# Patient Record
Sex: FEMALE | Race: WHITE | URBAN - METROPOLITAN AREA
[De-identification: names, ages, dates, MRNs, and addresses within clinical notes are randomized per-mention and may not be internally consistent; named-entity substitution may affect disease eponyms.]

---

## 2021-06-02 ENCOUNTER — OFFICE VISIT (OUTPATIENT)
Dept: URBAN - METROPOLITAN AREA CLINIC 98 | Facility: CLINIC | Age: 31
End: 2021-06-02

## 2021-06-10 ENCOUNTER — WEB ENCOUNTER (OUTPATIENT)
Dept: URBAN - METROPOLITAN AREA CLINIC 50 | Facility: CLINIC | Age: 31
End: 2021-06-10

## 2021-06-10 ENCOUNTER — CLAIMS CREATED FROM THE CLAIM WINDOW (OUTPATIENT)
Dept: URBAN - METROPOLITAN AREA CLINIC 50 | Facility: CLINIC | Age: 31
End: 2021-06-10
Payer: COMMERCIAL

## 2021-06-10 DIAGNOSIS — R10.9 ABDOMINAL DISCOMFORT: ICD-10-CM

## 2021-06-10 DIAGNOSIS — R19.7 DIARRHEA, UNSPECIFIED TYPE: ICD-10-CM

## 2021-06-10 DIAGNOSIS — R11.2 NON-INTRACTABLE VOMITING WITH NAUSEA, UNSPECIFIED VOMITING TYPE: ICD-10-CM

## 2021-06-10 PROCEDURE — 99214 OFFICE O/P EST MOD 30 MIN: CPT | Performed by: INTERNAL MEDICINE

## 2021-06-10 RX ORDER — DICYCLOMINE HYDROCHLORIDE 10 MG/1
1 CAPSULE CAPSULE ORAL
Qty: 60 | Refills: 3 | OUTPATIENT

## 2021-06-10 RX ORDER — PANTOPRAZOLE SODIUM 20 MG/1
TAPER TABLET, DELAYED RELEASE ORAL
Qty: 100 | Refills: 0 | OUTPATIENT

## 2021-06-10 NOTE — HPI-TODAY'S VISIT:
30 y.o. WF, single, no children, works as EMS and self-employed martial arts teacher Seen 3+ yrs ago w/ Dr. Benz All my life had stomach problems - nothing new Recently, in past 1-2 years, significantly worse and last 6 months even more and 2 months dropped 10 pounds unintentionally N/V/D and constant knot in stomach Has fatigue and bone pain that comes and goes Hair loss, bloating w/ everything she eats Lots of muscle cramping and excessive bruising Occ Zofran works  Reviewed recent labs - scanned in

## 2021-06-15 ENCOUNTER — TELEPHONE ENCOUNTER (OUTPATIENT)
Dept: URBAN - METROPOLITAN AREA CLINIC 98 | Facility: CLINIC | Age: 31
End: 2021-06-15

## 2021-06-15 ENCOUNTER — TELEPHONE ENCOUNTER (OUTPATIENT)
Dept: URBAN - METROPOLITAN AREA CLINIC 92 | Facility: CLINIC | Age: 31
End: 2021-06-15

## 2021-06-16 PROBLEM — 128241005: Status: ACTIVE | Noted: 2021-06-16

## 2021-07-08 ENCOUNTER — WEB ENCOUNTER (OUTPATIENT)
Dept: URBAN - METROPOLITAN AREA CLINIC 50 | Facility: CLINIC | Age: 31
End: 2021-07-08

## 2021-07-08 LAB
ALBUMIN: 4.4
ALKALINE PHOSPHATASE: 68
ALT (SGPT): 16
AST (SGOT): 23
BILIRUBIN, DIRECT: 0.11
BILIRUBIN, TOTAL: 0.4
HBSAG SCREEN: NEGATIVE
HBV IU/ML: (no result)
HEP A AB, IGM: NEGATIVE
HEP B CORE AB, IGM: NEGATIVE
HEP BE AG: NEGATIVE
HEP C VIRUS AB: <0.1
HEPATITIS B SURF AB QUANT: <3.1
LOG10 HBV IU/ML: (no result)
PROTEIN, TOTAL: 6.6
TEST INFORMATION:: (no result)

## 2021-07-21 ENCOUNTER — WEB ENCOUNTER (OUTPATIENT)
Dept: URBAN - METROPOLITAN AREA CLINIC 50 | Facility: CLINIC | Age: 31
End: 2021-07-21

## 2021-07-21 RX ORDER — ONDANSETRON 4 MG/1
1 TABLET ON THE TONGUE AND ALLOW TO DISSOLVE TABLET, ORALLY DISINTEGRATING ORAL
Qty: 60 | Refills: 1 | OUTPATIENT

## 2021-07-22 ENCOUNTER — TELEPHONE ENCOUNTER (OUTPATIENT)
Dept: URBAN - METROPOLITAN AREA CLINIC 98 | Facility: CLINIC | Age: 31
End: 2021-07-22

## 2021-07-29 ENCOUNTER — CLAIMS CREATED FROM THE CLAIM WINDOW (OUTPATIENT)
Dept: URBAN - METROPOLITAN AREA TELEHEALTH 2 | Facility: TELEHEALTH | Age: 31
End: 2021-07-29
Payer: COMMERCIAL

## 2021-07-29 ENCOUNTER — TELEPHONE ENCOUNTER (OUTPATIENT)
Dept: URBAN - METROPOLITAN AREA CLINIC 74 | Facility: CLINIC | Age: 31
End: 2021-07-29

## 2021-07-29 ENCOUNTER — TELEPHONE ENCOUNTER (OUTPATIENT)
Dept: URBAN - METROPOLITAN AREA CLINIC 98 | Facility: CLINIC | Age: 31
End: 2021-07-29

## 2021-07-29 DIAGNOSIS — R19.7 DIARRHEA, UNSPECIFIED TYPE: ICD-10-CM

## 2021-07-29 DIAGNOSIS — R10.9 ABDOMINAL DISTRESS: ICD-10-CM

## 2021-07-29 PROCEDURE — 99443 PHONE E/M BY PHYS 21-30 MIN: CPT | Performed by: INTERNAL MEDICINE

## 2021-07-29 RX ORDER — DICYCLOMINE HYDROCHLORIDE 10 MG/1
1 CAPSULE CAPSULE ORAL
Qty: 60 | Refills: 3 | Status: ACTIVE | COMMUNITY

## 2021-07-29 RX ORDER — PANTOPRAZOLE SODIUM 20 MG/1
TAPER TABLET, DELAYED RELEASE ORAL
Qty: 100 | Refills: 0 | Status: DISCONTINUED | COMMUNITY

## 2021-07-29 RX ORDER — ONDANSETRON 4 MG/1
1 TABLET ON THE TONGUE AND ALLOW TO DISSOLVE TABLET, ORALLY DISINTEGRATING ORAL
Qty: 60 | Refills: 1 | Status: ACTIVE | COMMUNITY

## 2021-07-29 RX ORDER — RIFAXIMIN 550 MG/1
1 TABLET TABLET ORAL THREE TIMES A DAY
Qty: 42 TABLET | Refills: 0 | OUTPATIENT

## 2021-07-29 NOTE — HPI-TODAY'S VISIT:
31 y.o. WF Spasm medicine helped quite a bit - calmed down stomach Stopped the Pantoprazole - hurt her stomach - no help Has been doing Low FODMAP diet -- has been cutting out  Stomach hurts all the time still Baseline off nausea that just doesn't bother her anymore - so used to it - moments where gets really bad - better w/ zofran Diarrhea is still the biggest issue - goes to bathroom 6-7 times / day No difference w/ stopping Strattera

## 2021-08-05 ENCOUNTER — WEB ENCOUNTER (OUTPATIENT)
Dept: URBAN - METROPOLITAN AREA CLINIC 50 | Facility: CLINIC | Age: 31
End: 2021-08-05

## 2021-08-05 LAB
A/G RATIO: 2.1
ALBUMIN: 4.2
ALKALINE PHOSPHATASE: 69
ALT (SGPT): 12
AST (SGOT): 16
BASO (ABSOLUTE): 0
BASOS: 1
BILIRUBIN, TOTAL: 0.3
BUN/CREATININE RATIO: 17
BUN: 18
CALCIUM: 9.2
CARBON DIOXIDE, TOTAL: 24
CHLORIDE: 106
CREATININE: 1.08
DEAMIDATED GLIADIN ABS, IGA: 3
DEAMIDATED GLIADIN ABS, IGG: 1
EGFR IF AFRICN AM: 78
EGFR IF NONAFRICN AM: 68
ENDOMYSIAL ANTIBODY IGA: NEGATIVE
EOS (ABSOLUTE): 0
EOS: 1
GLOBULIN, TOTAL: 2
GLUCOSE: 92
HEMATOCRIT: 40.7
HEMATOLOGY COMMENTS:: (no result)
HEMOGLOBIN: 13.7
IMMATURE CELLS: (no result)
IMMATURE GRANS (ABS): 0
IMMATURE GRANULOCYTES: 0
IMMUNOGLOBULIN A, QN, SERUM: 107
LYMPHS (ABSOLUTE): 2
LYMPHS: 27
MCH: 32.3
MCHC: 33.7
MCV: 96
MONOCYTES(ABSOLUTE): 0.6
MONOCYTES: 8
NEUTROPHILS (ABSOLUTE): 4.6
NEUTROPHILS: 63
NRBC: (no result)
PLATELETS: 220
POTASSIUM: 4.4
PROTEIN, TOTAL: 6.2
RBC: 4.24
RDW: 11.7
SODIUM: 143
T-TRANSGLUTAMINASE (TTG) IGA: <2
T-TRANSGLUTAMINASE (TTG) IGG: <2
WBC: 7.3

## 2021-08-17 ENCOUNTER — LAB OUTSIDE AN ENCOUNTER (OUTPATIENT)
Dept: URBAN - METROPOLITAN AREA CLINIC 96 | Facility: CLINIC | Age: 31
End: 2021-08-17

## 2021-08-25 ENCOUNTER — WEB ENCOUNTER (OUTPATIENT)
Dept: URBAN - METROPOLITAN AREA CLINIC 50 | Facility: CLINIC | Age: 31
End: 2021-08-25

## 2021-08-25 LAB
CALPROTECTIN, STOOL - QDX: (no result)
FECAL FAT, QUALITATIVE: (no result)
GASTROINTESTINAL PATHOGEN: (no result)
PANCREATICELASTASE ELISA, STOOL: (no result)

## 2021-09-13 ENCOUNTER — WEB ENCOUNTER (OUTPATIENT)
Dept: URBAN - METROPOLITAN AREA CLINIC 96 | Facility: CLINIC | Age: 31
End: 2021-09-13

## 2021-11-23 ENCOUNTER — CLAIMS CREATED FROM THE CLAIM WINDOW (OUTPATIENT)
Dept: URBAN - METROPOLITAN AREA CLINIC 74 | Facility: CLINIC | Age: 31
End: 2021-11-23
Payer: COMMERCIAL

## 2021-11-23 ENCOUNTER — WEB ENCOUNTER (OUTPATIENT)
Dept: URBAN - METROPOLITAN AREA CLINIC 74 | Facility: CLINIC | Age: 31
End: 2021-11-23

## 2021-11-23 VITALS
HEART RATE: 73 BPM | TEMPERATURE: 97.6 F | DIASTOLIC BLOOD PRESSURE: 58 MMHG | BODY MASS INDEX: 20.41 KG/M2 | WEIGHT: 145.8 LBS | OXYGEN SATURATION: 98 % | HEIGHT: 71 IN | SYSTOLIC BLOOD PRESSURE: 102 MMHG

## 2021-11-23 DIAGNOSIS — K60.2 ANAL FISSURE: ICD-10-CM

## 2021-11-23 DIAGNOSIS — K62.89 ANAL PAIN: ICD-10-CM

## 2021-11-23 DIAGNOSIS — K58.0 IRRITABLE BOWEL SYNDROME WITH DIARRHEA: ICD-10-CM

## 2021-11-23 DIAGNOSIS — K62.5 ANAL BLEEDING: ICD-10-CM

## 2021-11-23 DIAGNOSIS — K31.84 GASTROPARESIS: ICD-10-CM

## 2021-11-23 PROCEDURE — 99213 OFFICE O/P EST LOW 20 MIN: CPT | Performed by: INTERNAL MEDICINE

## 2021-11-23 RX ORDER — DICYCLOMINE HYDROCHLORIDE 10 MG/1
1 CAPSULE CAPSULE ORAL
Qty: 60 | Refills: 3 | Status: DISCONTINUED | COMMUNITY

## 2021-11-23 RX ORDER — NITROGLYCERIN 4 MG/G
AS DIRECTED OINTMENT RECTAL
Qty: 1 | Refills: 3 | OUTPATIENT
Start: 2021-11-23 | End: 2022-01-18

## 2021-11-23 RX ORDER — ONDANSETRON 4 MG/1
1 TABLET ON THE TONGUE AND ALLOW TO DISSOLVE TABLET, ORALLY DISINTEGRATING ORAL
Qty: 60 | Refills: 1 | Status: ACTIVE | COMMUNITY

## 2021-11-23 RX ORDER — RIFAXIMIN 550 MG/1
1 TABLET TABLET ORAL THREE TIMES A DAY
Qty: 42 TABLET | Refills: 0 | Status: DISCONTINUED | COMMUNITY

## 2021-11-23 NOTE — HPI-TODAY'S VISIT:
31 y.o. WF Spasm medicine helped quite a bit - calmed down stomach Stopped the Pantoprazole - hurt her stomach - no help Has been doing Low FODMAP diet -- has been cutting out  Stomach hurts all the time still Baseline off nausea that just doesn't bother her anymore - so used to it - moments where gets really bad - better w/ zofran Diarrhea is still the biggest issue - goes to bathroom 6-7 times / day No difference w/ stopping Strattera  Today November 23, 2021 the patient returns for a follow-up visit, the patient was last seen by Dr. Wesley Hollis on July 29, 2021 with diarrhea, abdominal discomfort, at the time according to notes the patient was doing better after dietary changes, the patient was suspected to have IBS-D and was prescribed Xifaxan, according to records she was to have a CT and lab as well as stools if not improved.  At the time of the visit the patient was taken dicyclomine 10 mg 3 times daily, ondansetron 4 mg as needed nausea and Xifaxan 550 mg 3 times a day.  Laboratory obtained on August 17, 2021 revealed a normal calprotectin, normal pancreatic elastase, normal fecal fat, there were no pathogens identified in the stool.  Laboratory obtained on October 8, 2021 revealed a C-reactive protein of 0.7, sed rate of 2, negative WILDER, normal thyroid testing.  Today the patient returns to the office stating that for a period of 1 month she has had recurrent anal rectal bleeding, bright red in color into the toilet tissue or up on the stool.  Along with it the patient has developed progressive pain in the anal canal, she states that she tries not to defecate because of the intensity of the pain.  The patient denied having any active diarrhea or constipation, she is currently not straining to defecate.  The patient did not palpate any perianal nodules.  The patient has been treated in the past for IBS-D, currently having more regular bowel movements, the patient also has a history of gastroparesis for which she takes as needed Reglan and ondansetron.  Currently she denies having any vomiting, mostly has nausea on a regular basis.  The patient has been prescribed nitroglycerin ointment 0.2% mixed with glycerin and Xylocaine 5% to be applied every 12 hours for 3 weeks.  The patient will contact the office in 7 days if not improving.  Due to the intensity of the discomfort no anorectal examination was performed today but it will be performed on the next visit.  In the meantime we will obtain an IBD panel.  The patient will return for a follow-up visit in 3 weeks.  The patient states that she had a colonoscopy performed by Dr. Gipson when she was in her 20s and that it was normal.

## 2021-12-01 LAB
ANCA BY IFA (RDL): NEGATIVE
ANTI-PANCREATIC AB BY IFA RDL: NEGATIVE
ASCA, IGA: <20
ASCA, IGG: <20

## 2021-12-14 ENCOUNTER — CLAIMS CREATED FROM THE CLAIM WINDOW (OUTPATIENT)
Dept: URBAN - METROPOLITAN AREA CLINIC 74 | Facility: CLINIC | Age: 31
End: 2021-12-14
Payer: COMMERCIAL

## 2021-12-14 VITALS
BODY MASS INDEX: 20.35 KG/M2 | HEART RATE: 57 BPM | OXYGEN SATURATION: 98 % | HEIGHT: 71 IN | TEMPERATURE: 97.5 F | WEIGHT: 145.4 LBS | SYSTOLIC BLOOD PRESSURE: 102 MMHG | DIASTOLIC BLOOD PRESSURE: 70 MMHG

## 2021-12-14 DIAGNOSIS — K58.0 IRRITABLE BOWEL SYNDROME WITH DIARRHEA: ICD-10-CM

## 2021-12-14 DIAGNOSIS — K60.2 ANAL FISSURE: ICD-10-CM

## 2021-12-14 DIAGNOSIS — K62.89 ANAL PAIN: ICD-10-CM

## 2021-12-14 DIAGNOSIS — K62.5 ANAL BLEEDING: ICD-10-CM

## 2021-12-14 DIAGNOSIS — K31.84 GASTROPARESIS: ICD-10-CM

## 2021-12-14 PROCEDURE — 99213 OFFICE O/P EST LOW 20 MIN: CPT | Performed by: INTERNAL MEDICINE

## 2021-12-14 RX ORDER — ONDANSETRON 4 MG/1
1 TABLET ON THE TONGUE AND ALLOW TO DISSOLVE TABLET, ORALLY DISINTEGRATING ORAL
OUTPATIENT

## 2021-12-14 RX ORDER — NITROGLYCERIN 4 MG/G
AS DIRECTED OINTMENT RECTAL
Qty: 1 | Refills: 3 | Status: ACTIVE | COMMUNITY
Start: 2021-11-23 | End: 2022-01-18

## 2021-12-14 RX ORDER — NITROGLYCERIN 4 MG/G
AS DIRECTED OINTMENT RECTAL
OUTPATIENT
Start: 2021-11-23

## 2021-12-14 RX ORDER — ONDANSETRON 4 MG/1
1 TABLET ON THE TONGUE AND ALLOW TO DISSOLVE TABLET, ORALLY DISINTEGRATING ORAL
Qty: 60 | Refills: 1 | Status: ACTIVE | COMMUNITY

## 2021-12-14 NOTE — HPI-TODAY'S VISIT:
31 y.o. WF Spasm medicine helped quite a bit - calmed down stomach Stopped the Pantoprazole - hurt her stomach - no help Has been doing Low FODMAP diet -- has been cutting out  Stomach hurts all the time still Baseline off nausea that just doesn't bother her anymore - so used to it - moments where gets really bad - better w/ zofran Diarrhea is still the biggest issue - goes to bathroom 6-7 times / day No difference w/ stopping Strattera  Today November 23, 2021 the patient returns for a follow-up visit, the patient was last seen by Dr. Wesley Hollis on July 29, 2021 with diarrhea, abdominal discomfort, at the time according to notes the patient was doing better after dietary changes, the patient was suspected to have IBS-D and was prescribed Xifaxan, according to records she was to have a CT and lab as well as stools if not improved.  At the time of the visit the patient was taken dicyclomine 10 mg 3 times daily, ondansetron 4 mg as needed nausea and Xifaxan 550 mg 3 times a day.  Laboratory obtained on August 17, 2021 revealed a normal calprotectin, normal pancreatic elastase, normal fecal fat, there were no pathogens identified in the stool.  Laboratory obtained on October 8, 2021 revealed a C-reactive protein of 0.7, sed rate of 2, negative WILDER, normal thyroid testing.  Today the patient returns to the office stating that for a period of 1 month she has had recurrent anal rectal bleeding, bright red in color into the toilet tissue or up on the stool.  Along with it the patient has developed progressive pain in the anal canal, she states that she tries not to defecate because of the intensity of the pain.  The patient denied having any active diarrhea or constipation, she is currently not straining to defecate.  The patient did not palpate any perianal nodules.  The patient has been treated in the past for IBS-D, currently having more regular bowel movements, the patient also has a history of gastroparesis for which she takes as needed Reglan and ondansetron.  Currently she denies having any vomiting, mostly has nausea on a regular basis.  The patient has been prescribed nitroglycerin ointment 0.2% mixed with glycerin and Xylocaine 5% to be applied every 12 hours for 3 weeks.  The patient will contact the office in 7 days if not improving.  Due to the intensity of the discomfort no anorectal examination was performed today but it will be performed on the next visit.  In the meantime we will obtain an IBD panel.  The patient will return for a follow-up visit in 3 weeks.  The patient states that she had a colonoscopy performed by Dr. Gipson when she was in her 20s and that it was normal.  Today December 14, 2021 the patient returns for a follow-up visit, the patient was last seen on November 23, 2021 with an anal fissure, anal bleeding, anal pain, irritable bowel syndrome with diarrhea and gastroparesis.  At the time of the last visit the patient stated that for a period of 1 month she had recurrent anal rectal bleeding, bright red in color into the toilet tissue or on the stool, along with it she had developed progressive pain in the anal canal, she tried not to defecate because of the intensity of the pain.  She denied having any active diarrhea or constipation, she was not straining to defecate.  The patient did not palpate any perianal lesions.  In the past the patient had been treated for IBS-D, at the time of the visit she was having more regular bowel movements, the patient also had a history of gastroparesis for which she was taking Reglan and ondansetron, the patient denied having any nausea, vomiting,.  The patient was prescribed nitroglycerin ointment 0.2% mixed with glycerin and Xylocaine 5% to be applied every 12 hours for 3 weeks.  She was to contact the office in 7 days if not improving.  The patient was very uncomfortable and we could not perform an anorectal examination at the time.  We did order an IBD panel.  Patient claimed that she had a colonoscopy performed by Dr. Gipson at age 18 and it was normal.  The IBD panel was reported to be negative on December 1, 2021.  The patient previous calprotectin was 27.2.  Today the patient returns to the office stating that while at home the nitroglycerin ointment she did well, she completed therapy for 14 days and then became constipated, the constipation was medication related, once she helped the constipating medication she started having more regular bowel movements but by then she was having again anal pain and burning at the time of defecation.  The patient to restart it the nitroglycerin 0.2% twice daily and is on her third day and seems to be improving.  The patient will complete 14 days of therapy if not improved she will be referred to colorectal surgery to be evaluated and treated for an anal fissure.  I discussed with the patient the recent lab, she was made aware that she had a negative IBD panel and calprotectin.

## 2022-01-18 ENCOUNTER — WEB ENCOUNTER (OUTPATIENT)
Dept: URBAN - METROPOLITAN AREA CLINIC 74 | Facility: CLINIC | Age: 32
End: 2022-01-18

## 2022-01-19 ENCOUNTER — LAB OUTSIDE AN ENCOUNTER (OUTPATIENT)
Dept: URBAN - METROPOLITAN AREA CLINIC 74 | Facility: CLINIC | Age: 32
End: 2022-01-19

## 2022-03-14 ENCOUNTER — WEB ENCOUNTER (OUTPATIENT)
Dept: URBAN - METROPOLITAN AREA CLINIC 74 | Facility: CLINIC | Age: 32
End: 2022-03-14

## 2022-04-14 ENCOUNTER — LAB OUTSIDE AN ENCOUNTER (OUTPATIENT)
Dept: URBAN - METROPOLITAN AREA CLINIC 74 | Facility: CLINIC | Age: 32
End: 2022-04-14

## 2022-04-14 ENCOUNTER — DASHBOARD ENCOUNTERS (OUTPATIENT)
Age: 32
End: 2022-04-14

## 2022-04-14 ENCOUNTER — CLAIMS CREATED FROM THE CLAIM WINDOW (OUTPATIENT)
Dept: URBAN - METROPOLITAN AREA CLINIC 74 | Facility: CLINIC | Age: 32
End: 2022-04-14
Payer: COMMERCIAL

## 2022-04-14 VITALS
BODY MASS INDEX: 21 KG/M2 | DIASTOLIC BLOOD PRESSURE: 62 MMHG | HEART RATE: 70 BPM | TEMPERATURE: 97.3 F | HEIGHT: 71 IN | OXYGEN SATURATION: 99 % | SYSTOLIC BLOOD PRESSURE: 120 MMHG | WEIGHT: 150 LBS

## 2022-04-14 DIAGNOSIS — K62.89 ANAL PAIN: ICD-10-CM

## 2022-04-14 DIAGNOSIS — K58.0 IRRITABLE BOWEL SYNDROME WITH DIARRHEA: ICD-10-CM

## 2022-04-14 DIAGNOSIS — K60.2 ANAL FISSURE: ICD-10-CM

## 2022-04-14 DIAGNOSIS — R11.0 NAUSEA: ICD-10-CM

## 2022-04-14 DIAGNOSIS — K62.5 ANAL BLEEDING: ICD-10-CM

## 2022-04-14 PROBLEM — 30037006: Status: ACTIVE | Noted: 2022-04-14

## 2022-04-14 PROBLEM — 68653001: Status: ACTIVE | Noted: 2022-04-14

## 2022-04-14 PROBLEM — 266464001: Status: ACTIVE | Noted: 2022-04-14

## 2022-04-14 PROCEDURE — 99213 OFFICE O/P EST LOW 20 MIN: CPT | Performed by: INTERNAL MEDICINE

## 2022-04-14 RX ORDER — ONDANSETRON 4 MG/1
1 TABLET ON THE TONGUE AND ALLOW TO DISSOLVE TABLET, ORALLY DISINTEGRATING ORAL
OUTPATIENT

## 2022-04-14 RX ORDER — NITROGLYCERIN 4 MG/G
AS DIRECTED OINTMENT RECTAL
Status: DISCONTINUED | COMMUNITY
Start: 2021-11-23

## 2022-04-14 RX ORDER — ONDANSETRON 4 MG/1
1 TABLET ON THE TONGUE AND ALLOW TO DISSOLVE TABLET, ORALLY DISINTEGRATING ORAL
Status: ACTIVE | COMMUNITY

## 2022-04-14 NOTE — PHYSICAL EXAM GASTROINTESTINAL
Abdomen , soft, tender upper abdomen,  nondistended , no guarding or rigidity , no masses palpable , normal bowel sounds , Liver and Spleen , no hepatomegaly present , no hepatosplenomegaly , liver nontender , spleen not palpable

## 2022-04-14 NOTE — HPI-TODAY'S VISIT:
31 y.o. W[place] Spasm medicine helped quite a bit - calmed down stomach Stopped the Pantoprazole - hurt her stomach - no help Has been doing Low FODMAP diet -- has been cutting out  Stomach hurts all the time still Baseline off nausea that just doesn't bother her anymore - so used to it - moments where gets really bad - better w/ zofran Diarrhea is still the biggest issue - goes to bathroom 6-7 times / day No difference w/ stopping Strattera  Today November 23, 2021 the patient returns for a follow-up visit, the patient was last seen by Dr. Wesley Hollis on July 29, 2021 with diarrhea, abdominal discomfort, at the time according to notes the patient was doing better after dietary changes, the patient was suspected to have IBS-D and was prescribed Xifaxan, according to records she was to have a CT and lab as well as stools if not improved.  At the time of the visit the patient was taken dicyclomine 10 mg 3 times daily, ondansetron 4 mg as needed nausea and Xifaxan 550 mg 3 times a day.  Laboratory obtained on August 17, 2021 revealed a normal calprotectin, normal pancreatic elastase, normal fecal fat, there were no pathogens identified in the stool.  Laboratory obtained on October 8, 2021 revealed a C-reactive protein of 0.7, sed rate of 2, negative WILDER, normal thyroid testing.  Today the patient returns to the office stating that for a period of 1 month she has had recurrent anal rectal bleeding, bright red in color into the toilet tissue or up on the stool.  Along with it the patient has developed progressive pain in the anal canal, she states that she tries not to defecate because of the intensity of the pain.  The patient denied having any active diarrhea or constipation, she is currently not straining to defecate.  The patient did not palpate any perianal nodules.  The patient has been treated in the past for IBS-D, currently having more regular bowel movements, the patient also has a history of gastroparesis for which she takes as needed Reglan and ondansetron.  Currently she denies having any vomiting, mostly has nausea on a regular basis.  The patient has been prescribed nitroglycerin ointment 0.2% mixed with glycerin and Xylocaine 5% to be applied every 12 hours for 3 weeks.  The patient will contact the office in 7 days if not improving.  Due to the intensity of the discomfort no anorectal examination was performed today but it will be performed on the next visit.  In the meantime we will obtain an IBD panel.  The patient will return for a follow-up visit in 3 weeks.  The patient states that she had a colonoscopy performed by Dr. Gipson when she was in her 20s and that it was normal.  Today December 14, 2021 the patient returns for a follow-up visit, the patient was last seen on November 23, 2021 with an anal fissure, anal bleeding, anal pain, irritable bowel syndrome with diarrhea and gastroparesis.  At the time of the last visit the patient stated that for a period of 1 month she had recurrent anal rectal bleeding, bright red in color into the toilet tissue or on the stool, along with it she had developed progressive pain in the anal canal, she tried not to defecate because of the intensity of the pain.  She denied having any active diarrhea or constipation, she was not straining to defecate.  The patient did not palpate any perianal lesions.  In the past the patient had been treated for IBS-D, at the time of the visit she was having more regular bowel movements, the patient also had a history of gastroparesis for which she was taking Reglan and ondansetron, the patient denied having any nausea, vomiting,.  The patient was prescribed nitroglycerin ointment 0.2% mixed with glycerin and Xylocaine 5% to be applied every 12 hours for 3 weeks.  She was to contact the office in 7 days if not improving.  The patient was very uncomfortable and we could not perform an anorectal examination at the time.  We did order an IBD panel.  Patient claimed that she had a colonoscopy performed by Dr. Gipson at age 18 and it was normal.  The IBD panel was reported to be negative on December 1, 2021.  The patient previous calprotectin was 27.2.  Today the patient returns to the office stating that while at home the nitroglycerin ointment she did well, she completed therapy for 14 days and then became constipated, the constipation was medication related, once she helped the constipating medication she started having more regular bowel movements but by then she was having again anal pain and burning at the time of defecation.  The patient to restart it the nitroglycerin 0.2% twice daily and is on her third day and seems to be improving.  The patient will complete 14 days of therapy if not improved she will be referred to colorectal surgery to be evaluated and treated for an anal fissure.  I discussed with the patient the recent lab, she was made aware that she had a negative IBD panel and calprotectin.  Today April 14, 2022 the patient returns for a follow-up visit, the patient was last seen in the office on December 14, 2021 with an anal fissure, anal bleeding, anal pain, irritable bowel syndrome with diarrhea and gastroparesis.  At the time of the last visit the patient stated that while at home the nitroglycerin ointment did well and she completed the 14-day therapy, then she became constipated, the constipation was medication related, once she is soft the constipation she started having more regular bowel movements but then was having anal pain and burning at the time of defecation.  The patient was advised to restart nitroglycerin 0.2% twice daily and by the third day it appeared to be improving.  The patient was advised to complete 14 days of therapy and if not improved we discussed a referral to colorectal surgery for the anal fissure.  The patient at the time of the visit had a negative IBD panel and a normal calprotectin.  Today the patient returns to the office stating that she continues to have frequent nausea, there has been no vomiting, no heartburn, no dysphagia or odynophagia.  The patient's weight remains stable.  The patient continues to have irregular bowel movements, on the average 2-4 soft bowel movements per day, no longer has anal pain.  I did review Dr. Roc Lutz's note, at the time of the visit she had a negative endoscopy.  The patient's recent nuclear gastric emptying study was normal at 2 hours.  The patient will be scheduled to have an EGD.  Benefits potential complications and alternatives to EGD have been discussed.  The patient will return for a follow-up visit after completion of testing.

## 2022-04-21 PROBLEM — 422587007: Status: ACTIVE | Noted: 2022-04-21

## 2022-05-02 ENCOUNTER — TELEPHONE ENCOUNTER (OUTPATIENT)
Dept: URBAN - METROPOLITAN AREA CLINIC 74 | Facility: CLINIC | Age: 32
End: 2022-05-02

## 2022-05-03 ENCOUNTER — TELEPHONE ENCOUNTER (OUTPATIENT)
Dept: URBAN - METROPOLITAN AREA CLINIC 74 | Facility: CLINIC | Age: 32
End: 2022-05-03

## 2022-05-09 ENCOUNTER — CLAIMS CREATED FROM THE CLAIM WINDOW (OUTPATIENT)
Dept: URBAN - METROPOLITAN AREA SURGERY CENTER 30 | Facility: SURGERY CENTER | Age: 32
End: 2022-05-09
Payer: COMMERCIAL

## 2022-05-09 DIAGNOSIS — K29.30 CHRONIC SUPERFICIAL GASTRITIS: ICD-10-CM

## 2022-05-09 DIAGNOSIS — K31.A11 INTESTINAL METAPLASIA OF ANTRUM OF STOMACH WITHOUT DYSPLASIA: ICD-10-CM

## 2022-05-09 DIAGNOSIS — K31.A12 INTESTINAL METAPLASIA OF BODY OF STOMACH WITHOUT DYSPLASIA: ICD-10-CM

## 2022-05-09 PROCEDURE — 43239 EGD BIOPSY SINGLE/MULTIPLE: CPT | Performed by: INTERNAL MEDICINE

## 2022-05-09 PROCEDURE — G8907 PT DOC NO EVENTS ON DISCHARG: HCPCS | Performed by: INTERNAL MEDICINE

## 2022-05-26 ENCOUNTER — OFFICE VISIT (OUTPATIENT)
Dept: URBAN - METROPOLITAN AREA CLINIC 74 | Facility: CLINIC | Age: 32
End: 2022-05-26

## 2022-06-28 ENCOUNTER — OFFICE VISIT (OUTPATIENT)
Dept: URBAN - METROPOLITAN AREA CLINIC 74 | Facility: CLINIC | Age: 32
End: 2022-06-28

## 2024-06-17 ENCOUNTER — TELEPHONE ENCOUNTER (OUTPATIENT)
Dept: URBAN - METROPOLITAN AREA CLINIC 92 | Facility: CLINIC | Age: 34
End: 2024-06-17